# Patient Record
Sex: MALE | Race: WHITE
[De-identification: names, ages, dates, MRNs, and addresses within clinical notes are randomized per-mention and may not be internally consistent; named-entity substitution may affect disease eponyms.]

---

## 2018-04-20 ENCOUNTER — HOSPITAL ENCOUNTER (OUTPATIENT)
Dept: HOSPITAL 31 - C.SDS | Age: 43
Discharge: HOME | End: 2018-04-20
Attending: STUDENT IN AN ORGANIZED HEALTH CARE EDUCATION/TRAINING PROGRAM
Payer: COMMERCIAL

## 2018-04-20 VITALS — BODY MASS INDEX: 29.4 KG/M2

## 2018-04-20 VITALS
TEMPERATURE: 97.5 F | DIASTOLIC BLOOD PRESSURE: 69 MMHG | HEART RATE: 69 BPM | SYSTOLIC BLOOD PRESSURE: 110 MMHG | RESPIRATION RATE: 12 BRPM

## 2018-04-20 VITALS — OXYGEN SATURATION: 99 %

## 2018-04-20 DIAGNOSIS — S72.032D: ICD-10-CM

## 2018-04-20 DIAGNOSIS — M76.32: ICD-10-CM

## 2018-04-20 DIAGNOSIS — T84.194A: Primary | ICD-10-CM

## 2018-04-20 DIAGNOSIS — S76.312D: ICD-10-CM

## 2018-04-20 DIAGNOSIS — M70.62: ICD-10-CM

## 2018-04-20 DIAGNOSIS — T84.84XA: ICD-10-CM

## 2018-04-20 PROCEDURE — 27062 REMOVE FEMUR LESION/BURSA: CPT

## 2018-04-20 PROCEDURE — 27599 UNLISTED PX FEMUR/KNEE: CPT

## 2018-04-20 PROCEDURE — 20680 REMOVAL OF IMPLANT DEEP: CPT

## 2018-04-20 PROCEDURE — 0232T NJX PLATELET PLASMA: CPT

## 2018-04-20 PROCEDURE — 38241 TRANSPLT AUTOL HCT/DONOR: CPT

## 2018-04-20 PROCEDURE — 27305 INCISE THIGH TENDON & FASCIA: CPT

## 2018-04-20 RX ADMIN — HYDROMORPHONE HYDROCHLORIDE PRN MG: 1 INJECTION, SOLUTION INTRAMUSCULAR; INTRAVENOUS; SUBCUTANEOUS at 18:58

## 2018-04-20 RX ADMIN — HYDROMORPHONE HYDROCHLORIDE PRN MG: 1 INJECTION, SOLUTION INTRAMUSCULAR; INTRAVENOUS; SUBCUTANEOUS at 17:41

## 2018-04-20 RX ADMIN — HYDROMORPHONE HYDROCHLORIDE PRN MG: 1 INJECTION, SOLUTION INTRAMUSCULAR; INTRAVENOUS; SUBCUTANEOUS at 18:10

## 2018-04-20 RX ADMIN — HYDROMORPHONE HYDROCHLORIDE PRN MG: 1 INJECTION, SOLUTION INTRAMUSCULAR; INTRAVENOUS; SUBCUTANEOUS at 19:45

## 2018-04-21 NOTE — RAD
PROCEDURE:  Intraoperative Fluoroscopy. 



HISTORY:

LT. HIP REVISION OF HARDWARE



FINDINGS:

Fluoroscopic assistance was provided for left hip orthopedic hardware 

revision. Please refer to the operative report from SHANON Perrin , MD KADY. 461 seconds of fluoro time was utilized with a total 

radiation cumulative dose 7.66 mGy.

## 2018-05-28 NOTE — OP
PROCEDURE DATE:  04/20/2018



PREOPERATIVE DIAGNOSES:  Left hip:

1.  Status post closed reduction and internal fixation of displaced femoral

neck fracture with 3 cannulated screws and washers on 10/10/2017.

2.  Femoral neck fracture malunion.

3.  Migrated screws from the previous surgery.

4.  Greater trochanteric bursitis.

5.  Iliotibial band syndrome.



POSTOPERATIVE DIAGNOSES:  Left hip:

1.  Status post closed reduction and internal fixation of displaced femoral

neck fracture with 3 cannulated screws and washers on 10/10/2017.

2.  Femoral neck fracture malunion.

3.  Migrated screws from the previous surgery.

4.  Greater trochanteric bursitis.

5.  Iliotibial band syndrome.



PROCEDURES:  Left hip:

1.  Revision of open reduction and internal fixation with 3 shorter screws,

femoral neck fracture.

2.  Removal of 3 deep screws.

3.  Bone graft/PRP/stem cell transplant - two screw path and residual

unhealed fracture.

4.  Open greater trochanteric bursectomy.

5.  Open iliotibial band release.



SURGEON:  Luis Miguel Parmar MD



ASSISTANT:  Alexandria Sawant PA-C



JUSTIFICATION FOR ASSISTANT:  Alexandria Sawant is a certified physician

assistant whose skilled surgical services were an absolute necessity for

successful completion of the procedure as he provided skilled surgical

assistance with removal of screws, surgical dissection, retraction of

neurovascular structures, placement of new hardware, facilitating and

acquiring stem cell bone graft and PRP mixture, open greater trochanteric

bursectomy, open iliotibial band release, wound closure.  Alexandria Sawant

was present for the entire case and was an absolute necessity for

successful completion of the procedure.



ANESTHESIA:  General endotracheal anesthesia.



SPECIMEN:  Three screws removed, sent to Pathology and then to patient.



ESTIMATED BLOOD LOSS:  50 mL.



COMPLICATIONS:  None.



DRAINS:  None.



IMPLANTS:  Three screws from [employer]:  Superior anterior screw was

replaced with a partially threaded 7.3 mm diameter, 85 mm length screw. 

The inferior screw was replaced with a fully threaded 7.3 mm diameter, 85

mm length screw.  All 3 washers from the previous surgery were recycled and

placed with the new screws.



DISPOSITION:  The patient was extubated and transferred to PACU in stable

condition and tolerated the procedure well.



INDICATIONS FOR SURGERY:  The patient is a 42-year-old male with no

significant past medical history who presents to the emergency room at

[facility] on the evening of 10/09/2017 with left hip pain and

inability to ambulate and bear weight on left lower extremity for few

hours.  This is a worker's comp case and the patient was indeed injured at

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  He stated that he slipped on water at the

bakery and landed on a concrete floor landing on his left hip while at work

on 10/09/2017.  He was brought to the emergency room at [facility] and after evaluation by ER staff and review of imaging, he was

diagnosed with a displaced femoral neck fracture.  As the orthopedic

on-call, I took the patient to the OR for emergent hip fracture surgery, he

was diagnosed with a displaced femoral neck fracture at the age of 42.  To

preserve his hip joint and minimize the need for conversion to a total hip

arthroplasty, a.k.a., the blood supply of the femoral head being disrupted

to a point of the femoral head undergoing osteonecrosis, emergent surgery

was carried out early in the morning.  He underwent closed reduction and

internal fixation of the displaced left hip femoral neck fracture at

[facility] in the early morning of 10/10/2017.  The fracture

was reduced via closed means and 3 partially threaded screws were placed

with washers to hold the fracture reduction as internal fixation.  Adequate

screw length and good tip to apex distance was maintained and the hardware

was placed in a good position.  During his postop followups in the office,

we started to notice that the screws were backing out and that the fracture

was healing in compression as a malunion with a shorter neck length.  As a

result, the screw heads were being pushed out into the greater trochanteric

bursa and iliotibial band.  This continued to progress but then plateaued

and stabilized.  The patient did not have pain with ambulation after 4

months of rehab and healing.  He progressed well with Physical Therapy and

was able to regain his range of motion.  His only complaint in 03/2018 was

pain along the greater trochanteric bursa and palpation of the screws as

the patient was of a small stature and very skinny.  After discussing

treatment options with the patient in his native language of Indonesian and

with the worker's comp company, we decided to proceed with a second

surgery.  He did undergo a second opinion/MICAELA and agreed with our proposed

surgery.  He was indicated for left hip removal of screws, exchange for

shorter screws to hold the fracture reduction and minimize the chances of a

stress riser from the empty screw path, transplant of stem cells, PRP, and

bone graft along the unhealed segments of fracture as well as the screw

path to help facilitate healing, open greater trochanteric bursectomy and

open iliotibial band release and all related indicated procedures.  After

answering all of his questions in the native tongue of Indonesian for which I

am fluent, we reviewed the risk, benefits, and alternatives.  The risks

included, but were not limited to, secondary migration of hardware and

painful hardware, need for further surgery, failure of the entire surgery

from beginning resulting in the need for conversion to a total hip

arthroplasty, development of chronic pain and disability, development of

blood clots including DVT and PE, anesthesia reactions including death,

cardiopulmonary perioperative complications.  After answering all of his

questions, he stated he understood the risks and wished to proceed with

surgery.  To determine how much of the fracture had actually healed and to

determine the possibility of removal of the screws altogether and packing

with bone graft, he did undergo a CAT scan preoperatively.  Initial CAT

scan was done without metal subtraction protocol, so it was difficult to

interpret, but he did undergo left hip CAT scan at [facility] with the

metal subtraction protocol employed which revealed that at the superior

aspect of the fracture, which would be the tension site of the fracture,

there was no evidence of healing but along the inferior aspect and for the

most part 85% of the fracture had successfully healed with a good union and

callous formation seen on CAT scan with bridging of the fracture site.  Due

to the patient's activity level and the possibility of a stress riser

developing even if the fracture was completely healed, we decided to

proceed with exchange of the screws as opposed to removal of the screws and

bone grafting.  To supplement the exchange of the screws and to help

promote further healing, the fracture site itself as well as the screw path

would undergo stem cell bone graft and PRP transplant.



Once we obtained authorizations in the worker's comp career, he was

referred to undergo preadmission testing and medical evaluation for medical

clearance, which was done successfully and the procedure was scheduled on

04/20/2018 at [facility].



PROCEDURE IN DETAIL:  The patient was identified in the preoperative

holding area and the left hip was marked for surgery.  Once again, as

described above, the risks, benefits, and alternatives to the procedure

were discussed at length with the patient and informed consent was obtained

in his native language of Indonesian for which I am fluent.  After a brief

discussion with anesthesia staff, perioperative IV antibiotics in the form

of 2 gm of Ancef were administered, and the patient was taken to the

operating room and placed on a well-padded operating room table that was

radiolucent with all bony prominences and superficial neurovascular

structures well-padded.  General anesthesia was administered without

difficulty or complication.  Examination under anesthesia was then carried

out.



EXAMINATION UNDER ANESTHESIA:  Left hip with surgical wounds that were well

healed.  Full range of motion compared to contralateral hip with no

evidence of crepitance.  All 3 screw heads were palpated along the greater

trochanteric bursa and iliotibial band with mechanical external snapping

palpated and seen.



The left hip was prepped and draped in standard sterile fashion.  A final

time-out was done with the surgeon, anesthesia staff, OR staff, all in

agreement with the patient, procedure being done, and the extremity being

operated on.  Biplanar fluoroscopic imaging was available and helped to

confirm that the left hip was indeed the operative site.  The 3 screws that

had migrated laterally were seen with the resulting malunion of the

shortened femoral neck, also seen on biplanar fluoroscopic imaging.  The

previous laterally based incision over the screw heads were utilized and

incision was made through the skin, down to subcutaneous tissue while

maintaining good hemostasis, down to the level of the iliotibial band.  The

iliotibial band was sharply incised to expose the underlying greater

trochanteric bursa and bone.  Overlying the 3 screws were significant

hypertrophic greater trochanteric bursa and an open bursectomy was carried

out not just for access to the screws but also as treatment for his pain

generator.  Once a complete greater trochanteric open bursectomy was

carried out while maintaining good hemostasis, we then proceeded with

placing guidewires within each one of the cannulated screws.  With the

guidewires in place, we were able to keep our screw paths easily.  The

washers were released and all 3 screws were removed while maintaining the

guidewires in position.  New screw lengths were obtained and all 3 screws

measured 85 mm length with good tip to the apex distance maintained.  The

screw paths while the K-wires were left in position were irrigated

copiously.  All the 3 washers were kept and recycled for the future screw

exchange.  With the help of anesthesia staff, 10 mL of PRP was obtained

from a venous stick and spun in the [employer] centrifuge.  This was mixed

with Arthrex DBM gel, 10 mL.  We also obtained stem cell, 5 mL, which was

mixed with the construct.  All 3 substances were mixed together and prior

to screw placement, we filled the screw track with the bone graft/PRP/stem

cell.  We then proceeded to place a fully threaded screw with washer

measuring 85 mm at the inferior screw path successfully.  The superior

anterior and superior posterior screws both measured 85 mm and were

partially threaded, 7.3 mm diameter screws, placed with the recycled

washers.  Once all 3 screws were placed in good position, final tightening

of the screws was carried out.  The superior aspect of the femoral neck

fracture was accessed through a modified anterior approach just to get

access to the superior aspect of the fracture and, indeed, there was a

small defect palpated.  With the help of my assistant and retractors in

position, we were able to place the mixture of bone graft, stem cell, and

PRP at the fracture site to help facilitate future healing after the

fracture site was copiously irrigated.  Once all 3 screws underwent final

tightening and the rest of the PRP stem cell and bone graft mixture was

placed in all the screw holes and screw paths as well as the superior

unhealed portion of the fracture, then we turned our attention to the open

iliotibial band release expanded beyond the level of the incision and to

treat the external snapping and to provide him with pain relief.  Once this

was completed to satisfaction, the wound was copiously irrigated and deep

tissue was reapproximated with #1 Vicryl suture followed by 2-0 Vicryl

suture for subcutaneous tissue followed by staples for skin.  Sterile

dressings were applied, and the patient was then extubated and transferred

to PACU in stable condition having tolerated the procedure well.



DISPOSITION:  The patient will be discharged home once he is recovered from

anesthesia.  He will be protective weightbearing to the left lower

extremity and is being given crutches and to work with Physical Therapy on

ambulation.  He will follow up in my office at [facility] within

1 week and already has postop appointment.  We gave him prescription for

Percocet for pain control.  He has been started on DVT prophylaxis starting

on postoperative day #1 in the form of 40 mg of Lovenox subcutaneous once

daily injection.  He will contact my office with any questions or concerns.





__________________________________________

Luis Miguel Parmar MD





DD:  05/27/2018 17:20:34

DT:  05/28/2018 1:08:36

[facility] # [id number]

## 2018-07-13 ENCOUNTER — HOSPITAL ENCOUNTER (OUTPATIENT)
Dept: HOSPITAL 31 - C.SDS | Age: 43
Discharge: HOME | End: 2018-07-13
Attending: STUDENT IN AN ORGANIZED HEALTH CARE EDUCATION/TRAINING PROGRAM
Payer: COMMERCIAL

## 2018-07-13 VITALS
HEART RATE: 76 BPM | DIASTOLIC BLOOD PRESSURE: 60 MMHG | OXYGEN SATURATION: 99 % | TEMPERATURE: 97.9 F | SYSTOLIC BLOOD PRESSURE: 108 MMHG

## 2018-07-13 VITALS — RESPIRATION RATE: 16 BRPM

## 2018-07-13 VITALS — BODY MASS INDEX: 29.4 KG/M2
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
## 2018-07-13 DIAGNOSIS — S72.032P: Primary | ICD-10-CM

## 2018-07-13 DIAGNOSIS — M25.652: ICD-10-CM

## 2018-07-13 DIAGNOSIS — M67.852: ICD-10-CM

## 2018-07-13 PROCEDURE — 20610 DRAIN/INJ JOINT/BURSA W/O US: CPT

## 2018-07-13 NOTE — PCM.SURG1
Surgeon's Initial Post Op Note





- Surgeon's Notes


Surgeon: Luis Miguel Parmar MD


Assistant: None


Type of Anesthesia: General LMA, Local


Pre-Operative Diagnosis: Left hip.  #1 malunion femoral neck fracture (s/p ORIF 

w/ 3 screws 10/9/17).  #2 stiffness.  #3 synovitis


Operative Findings: Left hip.  #1 malunion femoral neck fracture (s/p ORIF w/ 3 

screws 10/9/17).  #2 stiffness (EUA= 0-80 flex/ 0-5 ER/ 0-10Abd/ 0 ext/ 0-10 IR)

.  #3 synovitis


Post-Operative Diagnosis: Left hip.  #1 malunion femoral neck fracture (s/p 

ORIF w/ 3 screws 10/9/17).  #2 stiffness (EUA= 0-80 flex/ 0-5 ER/ 0-10Abd/ 0 ext

/ 0-10 IR).  #3 synovitis


Operation Performed: Left hip #1 intra-articular injection under flouroscopic 

guidance.  #2 FABIANO


Specimen/Specimens Removed: specimen= none.  complications= none.  implants= 

none.  Injection contents= 5cc total- 2cc 0.5% Marcaine w/o epi preservative 

free, 2cc 2% lidocaine w/o epi preservative free, 1cc 40 mg depo


Estimated Blood Loss: EBL {In ML}: 1


Blood Products Given: N/A


Drains Used: No Drains


Post-Op Condition: Good


Date of Surgery/Procedure: 07/13/18


Time of Surgery/Procedure: 08:00

## 2018-07-13 NOTE — RAD
Date of service: 



07/13/2018



PROCEDURE:  Intraoperative Fluoroscopy. 



HISTORY:

LT. HIP STIFFNESS



FINDINGS:

Fluoroscopic assistance was provided for left hip injection. Please 

refer to the operative report from SHANON Perrin.  

Total fluoroscopic time (continuous mode) utilized during the 

procedure (seconds) 36.1. Total exam DLP: 2.69 (mGy)

## 2018-07-23 NOTE — OP
PROCEDURE DATE:  07/13/2018



PREOPERATIVE DIAGNOSES:  Left hip:

1. Malunion femoral neck fracture (status post open reduction internal

fixation with 3 screws on 10/10/2017, status post revision open reduction

internal fixation with 3 shorter screws on 04/20/2018)

2.  Stiffness/loss of range of motion.

3.  Synovitis.



POSTOPERATIVE DIAGNOSES:  Left hip:

1. Malunion femoral neck fracture (status post open reduction internal

fixation with 3 screws on 10/10/2017, status post revision open reduction

internal fixation with 3 shorter screws on 04/20/2018)

2.  Stiffness/loss of range of motion (flexion 0 to 80 degrees/0 to 5

degrees external rotation/0 to 10 degrees abduction/0 degrees extension/0

to 10 degrees internal rotation).

3.  Synovitis.



PROCEDURE:  Left hip:

1.  Intraarticular injection under fluoroscopic guidance with cortisone

mixture.

2.  Manipulation under anesthesia.



SURGEON:  Luis Miguel Parmar MD.



ASSISTANT:  None.



TYPE OF ANESTHESIA:  General endotracheal anesthesia with local anaesthetic

applied by surgeon as well.



SPECIMEN:  None.



COMPLICATIONS:  None.



IMPLANTS:  None.



ESTIMATED BLOOD LOSS:  1 mL.



DRAINS:  None.



DISPOSITION:  The patient was extubated and transferred to PACU in stable

condition and tolerated the procedure well.



INJECTION CONTENTS:  5 mL in total consisting of 2 mL of 0.5% Marcaine

without epinephrine preservative free, 2 mL of 2% lidocaine without

epinephrine preservative free, 1 mL of 40 mg Depo-Medrol preservative free.



INDICATIONS FOR SURGERY:  The patient is a 43-year-old male, who had a slip

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

displaced femoral neck fracture.  He underwent open reduction and internal

fixation of the displaced femoral neck fracture with placement of 3 screws

with washer at [facility] on 10/10/2017.  He progressed well

and had successful union of the fracture.  He developed malunion with a

shortened femoral neck as well as posterior angulation of the fracture

itself.  With the shortening of the femoral neck, the screws back tracked

into the greater trochanteric bursa and iliotibial band, causing lateral

hip pain.  With these symptoms, lacking any improvement with conservative

treatment, he underwent a second procedure on 04/20/2018 at [facility]

consisting of exchange of the 3 screws with shorter length screws and

washers, as well as open greater trochanteric bursectomy and release of the

iliotibial band. He tolerated that procedure well and progressively had

good outcome with improvement in lateral hip pain.  He continued to have

groin pain with stiffness and difficulty with sitting, as he was not able

to obtain 90 degrees flexion.  We discussed possible treatment options

including intraarticular fluoroscopy guided injection and manipulation

under anesthesia.  The patient is ready to return to work, full duty.  The

injection may be used to calm down the inflammation/synovitis and provide

him with the local pain control that he needs to return to work

successfully without restrictions.  The risks, benefits, and alternatives

to the procedure were discussed at length with the patient with the risks

include, but not limited to infection, neurovascular damage, allergic

reaction, synovitis, postoperative pain, iatrogenic injury including

fracture, need for further surgery, non-resolution of stiffness and

recurrence of pain, development of blood clots including DVT and PE,

development of chronic pain and disability, anesthesia reactions including

death.  After answering all of his questions, he stated that he understood

the risks and wished to proceed with surgery.  All discussions about risks,

benefits, and alternatives and consents were done in his native language of

Turkmen, for which I am fluent.  He had recent surgery and after discussing

the case with anesthesia staff at [facility], it was determined that

PATs and preoperative medical evaluation was not necessary for such a small

procedure.  We obtained authorization from the workmen's comp carrier and

the procedure was scheduled on 07/13/2018 at [facility].



PROCEDURE IN DETAIL:  The patient was identified in the preoperative

holding area and the left hip was marked for surgery.  Once again as

described above, the risks, benefits, and alternatives to the procedure

were discussed at length with the patient in his native language of Turkmen

for which I am fluent.  After all questions were answered, informed consent

was obtained.  After brief discussion with the anesthesia staff,

perioperative IV antibiotics in the form of 2 g of Ancef was administered

and the patient was taken to the operating room and placed on well-padded

operating room table with all bony prominences and superficial

neurovascular structures well padded.  The operating room table was

radiolucent.  General anesthesia was administered without difficulty or

complications.  Final time-out was done with the surgeon, anesthesia staff,

and OR staff, all in agreement with the patient, procedure being done, and

extremity being operated on.  Once general anesthesia took full effect and

muscle relaxation was in effect, manipulation under anesthesia was carried

out after examination under anesthesia was done.



EXAMINATION UNDER ANESTHESIA:  Left hip with reduced range of motion, 0 to

80 degrees forward flexion, 0 to 5 degrees external rotation, 0 to 10

degrees abduction, 0 to 0 degrees extension, 0 to 10 degrees internal

rotation.



CONTINUATION OF PROCEDURE:  We proceeded with the manipulation under

anesthesia and we were able to obtain good range of motion without causing

any iatrogenic injury to the patient.



POST MANIPULATION UNDER ANESTHESIA, RANGE OF MOTION:  0 to 110 degrees

forward flexion, 0 to 30 degrees external rotation, 0 to 45 degrees

abduction, 0 to 5 degrees extension, 0 to 25 degrees internal rotation. 

Clinical pictures were taken to show to the patient as motivation that he

can obtain this range of motion and continue working with physical therapy

and aggressive home exercise program.



We then proceeded with intraarticular fluoroscopic guided injection.  The

left hip was prepped and draped in standard sterile fashion.  Landmarks

were palpated and confirmed with fluoroscopic imaging.  A line was drawn

from the tip of the ASIS down the long axis of the leg.  A line was drawn

from the tip of the greater trochanter across the long axis of the leg. 

Where the 2 lines intersected, care was taken to stay in the upper outer

quadrant for the spinal needle injection to avoid neurovascular injury. 

With the use of fluoroscopic imaging, the spinal needle entry point was

confirmed to be optimal and local anesthetic in the form of 10 mL of 2%

lidocaine without epinephrine preservative free were injected.  As the

local anesthetics were placed, multiple fluoroscopic x-ray images were

taken of the hip in different positions to confirm that no iatrogenic

injury or fracture occurred.  We also took images of full length of the

femur with the fluoroscopy machine to confirm this as well.  Once the local

anesthetic took effect, the spinal needle was advanced through the skin,

down to the superior head and neck junction.  Approximately 4 mL of 1:1

mixture of 2% lidocaine without epinephrine preservative free and

radiopaque contrast were injected and a positive ring sign confirmed an

intraarticular position of the spinal needle tip.  Excess contrast was

removed and the injection mixture was injected in its entirety, consisting

of 5 mL injected in total.  The injection contents consisted of 5 mL total

with a mixture of 2 mL of 0.5% Marcaine without epinephrine preservative

free, 2 mL of 2% lidocaine without epinephrine preservative free, 1 mL of

40 mg Depo-Medrol preservative free.  Once the injection was delivered in

its entirety, the spinal needle was removed and sterile dressings were

placed.  The hip again was taken through a full range of motion confirming

the range of motion achieved with the manipulation under anesthesia and

also dispersing the cortisone mixture.  The patient was then extubated from

general anesthesia and transferred to PACU in stable condition and

tolerated the procedure well.



I personally examined the patient in PACU and he confirmed that he had 0/10

pain localized to the groin and was very happy with the outcome of the

injection.  I was able to show him the clinical pictures of the positive

outcome of his manipulation under anesthesia and he said that he was

motivated to return to his home exercise program and physical therapy.  I

ranged his hip while he was awake in the PACU and confirmed the maintenance

of the range of motion _____ after the manipulation under anesthesia and

the patient was very delighted to see the improvement.



DISPOSITION:  The patient will be discharged home once he has recovered

from anesthesia.  He has been given a prescription for Vicoprofen for pain

control.  He will be weightbearing as tolerated with no restriction to the

left lower extremity.  He has been given a prescription to continue with

physical therapy 4 to 5 days a week to regain and maintain his range of

motion.  He is instructed to work hard at his home exercise program as

well.  He will return to work full duty without restrictions in the next

couple of weeks.  He will follow up in my office at [facility]

within one week and already has his postoperative appointment set up.







__________________________________________

Luis Miguel Parmar MD



DD:  07/23/2018 2:44:05

DT:  07/23/2018 6:02:09

Job # [id number]